# Patient Record
Sex: MALE | Employment: FULL TIME | URBAN - METROPOLITAN AREA
[De-identification: names, ages, dates, MRNs, and addresses within clinical notes are randomized per-mention and may not be internally consistent; named-entity substitution may affect disease eponyms.]

---

## 2020-09-18 ENCOUNTER — HOSPITAL ENCOUNTER (OUTPATIENT)
Dept: RADIOLOGY | Age: 40
Discharge: HOME | End: 2020-09-18
Payer: COMMERCIAL

## 2020-09-18 DIAGNOSIS — R10.32 LLQ PAIN: ICD-10-CM

## 2020-09-18 DIAGNOSIS — R10.9 ABDOMINAL PAIN, UNSPECIFIED ABDOMINAL LOCATION: ICD-10-CM

## 2020-09-18 DIAGNOSIS — K57.92 DIVERTICULITIS: ICD-10-CM

## 2020-09-18 PROCEDURE — G1004 CDSM NDSC: HCPCS

## 2020-09-18 PROCEDURE — 74177 CT ABD & PELVIS W/CONTRAST: CPT

## 2020-09-18 RX ADMIN — IOHEXOL 100 ML: 350 INJECTION, SOLUTION INTRAVENOUS at 09:06

## 2023-09-26 PROBLEM — K22.70 BARRETT'S ESOPHAGUS: Status: ACTIVE | Noted: 2020-10-01

## 2023-10-16 ENCOUNTER — HOSPITAL ENCOUNTER (OUTPATIENT)
Dept: RADIOLOGY | Facility: HOSPITAL | Age: 43
Discharge: HOME | End: 2023-10-16
Attending: INTERNAL MEDICINE
Payer: COMMERCIAL

## 2023-10-16 DIAGNOSIS — R11.11 VOMITING WITHOUT NAUSEA, UNSPECIFIED VOMITING TYPE: ICD-10-CM

## 2023-10-16 PROCEDURE — G1004 CDSM NDSC: HCPCS

## 2023-10-16 PROCEDURE — A9541 TC99M SULFUR COLLOID: HCPCS

## 2023-10-16 PROCEDURE — 78264 GASTRIC EMPTYING IMG STUDY: CPT

## 2024-08-06 ENCOUNTER — HOSPITAL ENCOUNTER (EMERGENCY)
Facility: HOSPITAL | Age: 44
Discharge: HOME | End: 2024-08-06
Attending: EMERGENCY MEDICINE
Payer: COMMERCIAL

## 2024-08-06 VITALS
DIASTOLIC BLOOD PRESSURE: 76 MMHG | WEIGHT: 225 LBS | RESPIRATION RATE: 14 BRPM | BODY MASS INDEX: 33.23 KG/M2 | SYSTOLIC BLOOD PRESSURE: 124 MMHG | HEART RATE: 69 BPM | TEMPERATURE: 98.5 F | OXYGEN SATURATION: 96 %

## 2024-08-06 DIAGNOSIS — F41.0 ANXIETY ATTACK: Primary | ICD-10-CM

## 2024-08-06 LAB
ANION GAP SERPL CALCULATED.3IONS-SCNC: 7 MMOL/L (ref 4–13)
ATRIAL RATE: 70 BPM
BASOPHILS # BLD AUTO: 0.03 THOUSANDS/ÂΜL (ref 0–0.1)
BASOPHILS NFR BLD AUTO: 1 % (ref 0–1)
BUN SERPL-MCNC: 15 MG/DL (ref 5–25)
CALCIUM SERPL-MCNC: 9.6 MG/DL (ref 8.4–10.2)
CHLORIDE SERPL-SCNC: 103 MMOL/L (ref 96–108)
CO2 SERPL-SCNC: 25 MMOL/L (ref 21–32)
CREAT SERPL-MCNC: 0.95 MG/DL (ref 0.6–1.3)
EOSINOPHIL # BLD AUTO: 0.2 THOUSAND/ÂΜL (ref 0–0.61)
EOSINOPHIL NFR BLD AUTO: 4 % (ref 0–6)
ERYTHROCYTE [DISTWIDTH] IN BLOOD BY AUTOMATED COUNT: 12 % (ref 11.6–15.1)
GFR SERPL CREATININE-BSD FRML MDRD: 98 ML/MIN/1.73SQ M
GLUCOSE SERPL-MCNC: 105 MG/DL (ref 65–140)
GLUCOSE SERPL-MCNC: 112 MG/DL (ref 65–140)
HCT VFR BLD AUTO: 43 % (ref 36.5–49.3)
HGB BLD-MCNC: 15.4 G/DL (ref 12–17)
IMM GRANULOCYTES # BLD AUTO: 0.03 THOUSAND/UL (ref 0–0.2)
IMM GRANULOCYTES NFR BLD AUTO: 1 % (ref 0–2)
LYMPHOCYTES # BLD AUTO: 1.13 THOUSANDS/ÂΜL (ref 0.6–4.47)
LYMPHOCYTES NFR BLD AUTO: 20 % (ref 14–44)
MAGNESIUM SERPL-MCNC: 1.8 MG/DL (ref 1.9–2.7)
MCH RBC QN AUTO: 31.3 PG (ref 26.8–34.3)
MCHC RBC AUTO-ENTMCNC: 35.8 G/DL (ref 31.4–37.4)
MCV RBC AUTO: 87 FL (ref 82–98)
MONOCYTES # BLD AUTO: 0.43 THOUSAND/ÂΜL (ref 0.17–1.22)
MONOCYTES NFR BLD AUTO: 8 % (ref 4–12)
NEUTROPHILS # BLD AUTO: 3.81 THOUSANDS/ÂΜL (ref 1.85–7.62)
NEUTS SEG NFR BLD AUTO: 66 % (ref 43–75)
NRBC BLD AUTO-RTO: 0 /100 WBCS
P AXIS: 28 DEGREES
PLATELET # BLD AUTO: 216 THOUSANDS/UL (ref 149–390)
PMV BLD AUTO: 9.9 FL (ref 8.9–12.7)
POTASSIUM SERPL-SCNC: 3.8 MMOL/L (ref 3.5–5.3)
PR INTERVAL: 162 MS
QRS AXIS: 26 DEGREES
QRSD INTERVAL: 82 MS
QT INTERVAL: 400 MS
QTC INTERVAL: 432 MS
RBC # BLD AUTO: 4.92 MILLION/UL (ref 3.88–5.62)
SODIUM SERPL-SCNC: 135 MMOL/L (ref 135–147)
T WAVE AXIS: 6 DEGREES
TSH SERPL DL<=0.05 MIU/L-ACNC: 1.92 UIU/ML (ref 0.45–4.5)
VENTRICULAR RATE: 70 BPM
WBC # BLD AUTO: 5.63 THOUSAND/UL (ref 4.31–10.16)

## 2024-08-06 PROCEDURE — 85025 COMPLETE CBC W/AUTO DIFF WBC: CPT | Performed by: EMERGENCY MEDICINE

## 2024-08-06 PROCEDURE — 84443 ASSAY THYROID STIM HORMONE: CPT | Performed by: EMERGENCY MEDICINE

## 2024-08-06 PROCEDURE — 93005 ELECTROCARDIOGRAM TRACING: CPT

## 2024-08-06 PROCEDURE — 82948 REAGENT STRIP/BLOOD GLUCOSE: CPT

## 2024-08-06 PROCEDURE — 80048 BASIC METABOLIC PNL TOTAL CA: CPT | Performed by: EMERGENCY MEDICINE

## 2024-08-06 PROCEDURE — 93010 ELECTROCARDIOGRAM REPORT: CPT | Performed by: INTERNAL MEDICINE

## 2024-08-06 PROCEDURE — 99283 EMERGENCY DEPT VISIT LOW MDM: CPT

## 2024-08-06 PROCEDURE — 36415 COLL VENOUS BLD VENIPUNCTURE: CPT | Performed by: EMERGENCY MEDICINE

## 2024-08-06 PROCEDURE — 83735 ASSAY OF MAGNESIUM: CPT | Performed by: EMERGENCY MEDICINE

## 2024-08-06 RX ORDER — ESCITALOPRAM OXALATE 5 MG/1
5 TABLET ORAL DAILY
COMMUNITY

## 2024-08-06 RX ORDER — BUSPIRONE HYDROCHLORIDE 5 MG/1
5 TABLET ORAL 3 TIMES DAILY
COMMUNITY

## 2024-08-06 NOTE — ED PROVIDER NOTES
History  Chief Complaint   Patient presents with    Anxiety     Pt arrives via EMS from the dentist office after experiencing an anxiety attack while standing up. Pt reports he was switched from amitriptyline to lexapro on Saturday. Pt reports he was hyperventilating and felt light headed and nauseous. Pt denies cp, sob and any syncopal episodes.       HPI  Patient is a 43-year-old male history of anxiety, depression who is presenting via EMS to the emergency department.  Patient was at the dentist office and had an anxiety attack.  Patient has recently switched from amitriptyline escitalopram.  He has been experiencing anxiety depression for at least 2 years.  His therapist recommended to switch in his primary care physician ordered the new medication.  His last dose of amitriptyline was on Thursday night.  He did  his escitalopram on Friday but did not take it until Saturday.  Yesterday he was feeling twitchy and sweaty.  He felt anxious.  He does have chronic abdominal pain which he describes as a band across his mid/upper abdomen that has been going on for about 3 years.  He also has experienced vomiting which is not changed.  Today he felt anxious and was able to improve his status by talking to his wife.  He did have a dentist appointment and while he was there he experienced hyperventilation, hand shaking, felt like he could not breathe.  Denied any syncope, chest pain.  Patient currently described that he feels better but has his chronic abdominal discomfort.  He denies any homicidal or suicidal ideations, does not have any hallucinations.  Occasionally uses THC and alcohol.  Has not had any issues with alcohol withdrawal.    Prior to Admission Medications   Prescriptions Last Dose Informant Patient Reported? Taking?   busPIRone (BUSPAR) 5 mg tablet 8/6/2024 Self Yes Yes   Sig: Take 5 mg by mouth 3 (three) times a day   escitalopram (LEXAPRO) 5 mg tablet 8/6/2024 Self Yes Yes   Sig: Take 5 mg by mouth  daily   omeprazole-sodium bicarbonate (ZEGERID)  MG per capsule Past Week  No Yes   Sig: TAKE 1 CAPSULE BY MOUTH 2 (TWO) TIMES A DAY BEFORE MEALS      Facility-Administered Medications: None       Past Medical History:   Diagnosis Date    Boyce's esophagus 10/2020    Colon polyp     Adenoma polyp 10/2020.    GERD (gastroesophageal reflux disease)     Pyloric stenosis, congenital     Surgery at age 2    Pyloric stenosis, congenital     Surgically corrected as an infant, postop complication of dehiscent.       Past Surgical History:   Procedure Laterality Date    COLONOSCOPY  10/12/2020    1 polyp-tubular adenoma    EGD  10/12/2020    Boyce's esophagus-biopsy positive for intestinal metaplasia negative for dysplasia.  Mild gastritis.  Biopsy duodenum negative for celiac    EGD  09/2023    short seg barretts; no dysplasia; dr patel       No family history on file.  I have reviewed and agree with the history as documented.    E-Cigarette/Vaping     E-Cigarette/Vaping Substances     Social History     Tobacco Use    Smoking status: Every Day    Smokeless tobacco: Never    Tobacco comments:     Vapes. Tobacco use since age 14, 1-2 packs per day.    Substance Use Topics    Alcohol use: Yes     Alcohol/week: 7.0 standard drinks of alcohol     Types: 7 Standard drinks or equivalent per week     Comment: ocassionally    Drug use: Yes     Frequency: 7.0 times per week     Types: Marijuana     Comment: nightly       Review of Systems   Constitutional:  Negative for chills and fever.   HENT:  Negative for congestion and sore throat.    Eyes:  Negative for visual disturbance.   Respiratory:  Negative for cough, chest tightness and shortness of breath.    Cardiovascular:  Negative for chest pain and palpitations.   Gastrointestinal:  Positive for abdominal pain (chronic unchanged), diarrhea (chronic) and nausea (chronic). Negative for constipation and vomiting (chronic).   Genitourinary:  Negative for difficulty  urinating and dysuria.   Neurological:  Negative for syncope and headaches.   All other systems reviewed and are negative.      Physical Exam  Physical Exam  Vitals and nursing note reviewed.   HENT:      Head: Normocephalic and atraumatic.      Right Ear: External ear normal.      Left Ear: External ear normal.      Nose: Nose normal.   Eyes:      Pupils: Pupils are equal, round, and reactive to light.   Cardiovascular:      Pulses: Normal pulses.   Pulmonary:      Effort: Pulmonary effort is normal.      Breath sounds: Normal breath sounds.   Abdominal:      General: Abdomen is flat.      Palpations: Abdomen is soft.   Musculoskeletal:         General: Normal range of motion.      Cervical back: Normal range of motion and neck supple.      Right lower leg: No edema.      Left lower leg: No edema.   Skin:     General: Skin is warm and dry.      Capillary Refill: Capillary refill takes less than 2 seconds.   Neurological:      General: No focal deficit present.      Mental Status: He is alert.      GCS: GCS eye subscore is 4. GCS verbal subscore is 5. GCS motor subscore is 6.      Cranial Nerves: Cranial nerves 2-12 are intact.      Sensory: Sensation is intact.      Motor: Motor function is intact.      Gait: Gait is intact.   Psychiatric:         Attention and Perception: Attention and perception normal. He does not perceive auditory hallucinations.         Mood and Affect: Mood is anxious.         Speech: Speech normal.         Behavior: Behavior normal. Behavior is cooperative.         Thought Content: Thought content normal.         Vital Signs  ED Triage Vitals [08/06/24 0947]   Temperature Pulse Respirations Blood Pressure SpO2   98.5 °F (36.9 °C) 75 18 (!) 139/112 100 %      Temp Source Heart Rate Source Patient Position - Orthostatic VS BP Location FiO2 (%)   Oral Monitor Lying Right arm --      Pain Score       No Pain           Vitals:    08/06/24 0947 08/06/24 1000 08/06/24 1100   BP: (!) 139/112 132/83  124/76   Pulse: 75 69 69   Patient Position - Orthostatic VS: Lying Lying Lying         Visual Acuity      ED Medications  Medications - No data to display    Diagnostic Studies  Results Reviewed       Procedure Component Value Units Date/Time    TSH, 3rd generation with Free T4 reflex [[id number]]  (Normal) Collected: 08/06/24 1021    Lab Status: Final result Specimen: Blood from Arm, Left Updated: 08/06/24 1109     TSH 3RD GENERATON 1.915 uIU/mL     Basic metabolic panel [[id number]] Collected: 08/06/24 1021    Lab Status: Final result Specimen: Blood from Arm, Left Updated: 08/06/24 1054     Sodium 135 mmol/L      Potassium 3.8 mmol/L      Chloride 103 mmol/L      CO2 25 mmol/L      ANION GAP 7 mmol/L      BUN 15 mg/dL      Creatinine 0.95 mg/dL      Glucose 105 mg/dL      Calcium 9.6 mg/dL      eGFR 98 ml/min/1.73sq m     Narrative:      National Kid[employer] Foundation guidelines for Chronic Kidney Disease (CKD):     Stage 1 with normal or high GFR (GFR > 90 mL/min/1.73 square meters)    Stage 2 Mild CKD (GFR = 60-89 mL/min/1.73 square meters)    Stage 3A Moderate CKD (GFR = 45-59 mL/min/1.73 square meters)    Stage 3B Moderate CKD (GFR = 30-44 mL/min/1.73 square meters)    Stage 4 Severe CKD (GFR = 15-29 mL/min/1.73 square meters)    Stage 5 End Stage CKD (GFR <15 mL/min/1.73 square meters)  Note: GFR calculation is accurate only with a steady state creatinine    Magnesium [[id number]]  (Abnormal) Collected: 08/06/24 1021    Lab Status: Final result Specimen: Blood from Arm, Left Updated: 08/06/24 1054     Magnesium 1.8 mg/dL     CBC and differential [[id number]] Collected: 08/06/24 1021    Lab Status: Final result Specimen: Blood from Arm, Left Updated: 08/06/24 1030     WBC 5.63 Thousand/uL      RBC 4.92 Million/uL      Hemoglobin 15.4 g/dL      Hematocrit 43.0 %      MCV 87 fL      MCH 31.3 pg      MCHC 35.8 g/dL      RDW 12.0 %      MPV 9.9 fL      Platelets 216 Thousands/uL      nRBC 0 /100 WBCs       Segmented % 66 %      Immature Grans % 1 %      Lymphocytes % 20 %      Monocytes % 8 %      Eosinophils Relative 4 %      Basophils Relative 1 %      Absolute Neutrophils 3.81 Thousands/µL      Absolute Immature Grans 0.03 Thousand/uL      Absolute Lymphocytes 1.13 Thousands/µL      Absolute Monocytes 0.43 Thousand/µL      Eosinophils Absolute 0.20 Thousand/µL      Basophils Absolute 0.03 Thousands/µL     Fingerstick Glucose (POCT) [[id number]]  (Normal) Collected: 08/06/24 1023    Lab Status: Final result Specimen: Blood Updated: 08/06/24 1023     POC Glucose 112 mg/dl                    No orders to display              Procedures  Procedures         ED Course  ED Course as of 08/06/24 1138   Tue Aug 06, 2024   1031 CBC and differential  Normal   1031 Fingerstick Glucose (POCT)  Normal   1031 EKG August 6 at 1024 sinus rhythm, rate 70 bpm, intervals within normal limits.  Normal axis.  No MARGUERITE or hyperkalemia   1032 ECG 12 lead   1110 Labs reviewed, within normal limits.  Plan to discharge patient home with supportive care and outpatient follow-up to his primary team.   1128 Reviewed labs with pt. He is feeling improved. He will call father in law for a ride. Understands follow up care.                                 SBIRT 20yo+      Flowsheet Row Most Recent Value   Initial Alcohol Screen: US AUDIT-C     1. How often do you have a drink containing alcohol? 0 Filed at: 08/06/2024 0949   2. How many drinks containing alcohol do you have on a typical day you are drinking?  0 Filed at: 08/06/2024 0949   3a. Male UNDER 65: How often do you have five or more drinks on one occasion? 0 Filed at: 08/06/2024 0949   Audit-C Score 0 Filed at: 08/06/2024 0949   ASAF: How many times in the past year have you...    Used an illegal drug or used a prescription medication for non-medical reasons? Never Filed at: 08/06/2024 0949                      Medical Decision Making  43-year-old male presenting with symptoms likely related  to chronic anxiety and depression.  Currently his symptoms has resolved.  This episode was unlike his previous episodes.  His exam is benign without any deficits, clonus, rigidity.  He has no thoughts of self-harm.  His abdomen is soft.  His vital signs are notable for elevated blood pressure which is expected in his clinical situation.  Plan to check basic labs, EKG. he has no evidence of increased serotonergic tone.  He has no indications for involuntary behavioral health needs.  His abdominal symptoms are chronic and unchanged and does not want any imaging.  That the symptoms are improved will defer anxiolysis.  Encouraged patient to call his therapist to discuss today's episode and encouraged him to have close follow-up.    Amount and/or Complexity of Data Reviewed  Labs: ordered. Decision-making details documented in ED Course.  ECG/medicine tests:  Decision-making details documented in ED Course.                 Disposition  Final diagnoses:   Anxiety attack     Time reflects when diagnosis was documented in both MDM as applicable and the Disposition within this note       Time User Action Codes Description Comment    8/6/2024 10:20 AM Lemuel Molina Add [F41.9] Anxiety     8/6/2024 11:27 AM Lemuel Molina Add [F41.0] Anxiety attack     8/6/2024 11:27 AM Lemuel Molina Modify [F41.0] Anxiety attack     8/6/2024 11:27 AM Lemuel Molina Remove [F41.9] Anxiety           ED Disposition       ED Disposition   Discharge    Condition   Stable    Date/Time   Tue Aug 6, 2024 1127    Comment   S[place] Branch discharge to home/self care.                   Follow-up Information       Follow up With Specialties Details Why Contact Info Additional Information    [facility] Emergency Department Emergency Medicine Go to  As needed, If symptoms worsen [address] [facility] [id number] [phone] [facility] Emergency Department, [address], [place], [address]             Patient's Medications   Discharge Prescriptions    No medications on file       No discharge procedures on file.    PDMP Review       None            ED Provider  Electronically Signed by             Lemuel Molina DO  08/06/24 3438

## 2024-08-06 NOTE — DISCHARGE INSTRUCTIONS
Please call your behavioral health team to discuss today's events and need for follow-up.  Please continue to take your medications as prescribed.  Please return to the emergency department if symptoms are not able to be controlled at home.